# Patient Record
Sex: FEMALE | Race: WHITE | Employment: UNEMPLOYED | URBAN - METROPOLITAN AREA
[De-identification: names, ages, dates, MRNs, and addresses within clinical notes are randomized per-mention and may not be internally consistent; named-entity substitution may affect disease eponyms.]

---

## 2021-11-17 VITALS
WEIGHT: 119.05 LBS | SYSTOLIC BLOOD PRESSURE: 117 MMHG | RESPIRATION RATE: 16 BRPM | OXYGEN SATURATION: 100 % | HEART RATE: 103 BPM | TEMPERATURE: 97.8 F | DIASTOLIC BLOOD PRESSURE: 73 MMHG

## 2021-11-17 PROCEDURE — 99285 EMERGENCY DEPT VISIT HI MDM: CPT

## 2021-11-18 ENCOUNTER — HOSPITAL ENCOUNTER (EMERGENCY)
Facility: CLINIC | Age: 21
Discharge: HOME | End: 2021-11-18
Attending: EMERGENCY MEDICINE | Admitting: EMERGENCY MEDICINE

## 2021-11-18 ENCOUNTER — APPOINTMENT (OUTPATIENT)
Dept: CT IMAGING | Facility: CLINIC | Age: 21
End: 2021-11-18
Attending: EMERGENCY MEDICINE

## 2021-11-18 ENCOUNTER — APPOINTMENT (OUTPATIENT)
Dept: GENERAL RADIOLOGY | Facility: CLINIC | Age: 21
End: 2021-11-18
Attending: EMERGENCY MEDICINE

## 2021-11-18 DIAGNOSIS — S16.1XXA STRAIN OF NECK MUSCLE, INITIAL ENCOUNTER: ICD-10-CM

## 2021-11-18 DIAGNOSIS — S30.1XXA CONTUSION OF ABDOMINAL WALL, INITIAL ENCOUNTER: ICD-10-CM

## 2021-11-18 DIAGNOSIS — S13.4XXA WHIPLASH INJURY TO NECK, INITIAL ENCOUNTER: ICD-10-CM

## 2021-11-18 LAB
ANION GAP SERPL CALCULATED.3IONS-SCNC: 5 MMOL/L (ref 3–14)
BASOPHILS # BLD AUTO: 0.1 10E3/UL (ref 0–0.2)
BASOPHILS NFR BLD AUTO: 1 %
BUN SERPL-MCNC: 18 MG/DL (ref 7–30)
CALCIUM SERPL-MCNC: 8.8 MG/DL (ref 8.5–10.1)
CHLORIDE BLD-SCNC: 109 MMOL/L (ref 94–109)
CO2 SERPL-SCNC: 25 MMOL/L (ref 20–32)
CREAT BLD-MCNC: 0.8 MG/DL (ref 0.5–1)
CREAT SERPL-MCNC: 0.76 MG/DL (ref 0.52–1.04)
EOSINOPHIL # BLD AUTO: 0.1 10E3/UL (ref 0–0.7)
EOSINOPHIL NFR BLD AUTO: 0 %
ERYTHROCYTE [DISTWIDTH] IN BLOOD BY AUTOMATED COUNT: 13.4 % (ref 10–15)
GFR SERPL CREATININE-BSD FRML MDRD: >60 ML/MIN/1.73M2
GFR SERPL CREATININE-BSD FRML MDRD: >90 ML/MIN/1.73M2
GLUCOSE BLD-MCNC: 82 MG/DL (ref 70–99)
HCT VFR BLD AUTO: 38.9 % (ref 35–47)
HGB BLD-MCNC: 12.7 G/DL (ref 11.7–15.7)
IMM GRANULOCYTES # BLD: 0.2 10E3/UL
IMM GRANULOCYTES NFR BLD: 1 %
LYMPHOCYTES # BLD AUTO: 2.4 10E3/UL (ref 0.8–5.3)
LYMPHOCYTES NFR BLD AUTO: 13 %
MCH RBC QN AUTO: 30 PG (ref 26.5–33)
MCHC RBC AUTO-ENTMCNC: 32.6 G/DL (ref 31.5–36.5)
MCV RBC AUTO: 92 FL (ref 78–100)
MONOCYTES # BLD AUTO: 1.8 10E3/UL (ref 0–1.3)
MONOCYTES NFR BLD AUTO: 10 %
NEUTROPHILS # BLD AUTO: 14.2 10E3/UL (ref 1.6–8.3)
NEUTROPHILS NFR BLD AUTO: 75 %
NRBC # BLD AUTO: 0 10E3/UL
NRBC BLD AUTO-RTO: 0 /100
PLATELET # BLD AUTO: 226 10E3/UL (ref 150–450)
POTASSIUM BLD-SCNC: 3.7 MMOL/L (ref 3.4–5.3)
RBC # BLD AUTO: 4.24 10E6/UL (ref 3.8–5.2)
SODIUM SERPL-SCNC: 139 MMOL/L (ref 133–144)
WBC # BLD AUTO: 18.8 10E3/UL (ref 4–11)

## 2021-11-18 PROCEDURE — 85025 COMPLETE CBC W/AUTO DIFF WBC: CPT | Performed by: EMERGENCY MEDICINE

## 2021-11-18 PROCEDURE — 82565 ASSAY OF CREATININE: CPT | Mod: 91

## 2021-11-18 PROCEDURE — 250N000009 HC RX 250: Performed by: EMERGENCY MEDICINE

## 2021-11-18 PROCEDURE — 36415 COLL VENOUS BLD VENIPUNCTURE: CPT | Performed by: EMERGENCY MEDICINE

## 2021-11-18 PROCEDURE — 250N000011 HC RX IP 250 OP 636: Performed by: EMERGENCY MEDICINE

## 2021-11-18 PROCEDURE — 71046 X-RAY EXAM CHEST 2 VIEWS: CPT

## 2021-11-18 PROCEDURE — 80048 BASIC METABOLIC PNL TOTAL CA: CPT | Performed by: EMERGENCY MEDICINE

## 2021-11-18 PROCEDURE — 74177 CT ABD & PELVIS W/CONTRAST: CPT

## 2021-11-18 RX ORDER — METHOCARBAMOL 750 MG/1
750 TABLET, FILM COATED ORAL 3 TIMES DAILY PRN
Qty: 16 TABLET | Refills: 0 | Status: SHIPPED | OUTPATIENT
Start: 2021-11-18 | End: 2021-11-23

## 2021-11-18 RX ORDER — IOPAMIDOL 755 MG/ML
60 INJECTION, SOLUTION INTRAVASCULAR ONCE
Status: COMPLETED | OUTPATIENT
Start: 2021-11-18 | End: 2021-11-18

## 2021-11-18 RX ADMIN — IOPAMIDOL 60 ML: 755 INJECTION, SOLUTION INTRAVENOUS at 01:19

## 2021-11-18 RX ADMIN — SODIUM CHLORIDE 60 ML: 900 INJECTION INTRAVENOUS at 01:19

## 2021-11-18 ASSESSMENT — ENCOUNTER SYMPTOMS
ABDOMINAL PAIN: 1
NECK PAIN: 1
MYALGIAS: 1
SHORTNESS OF BREATH: 0
FLANK PAIN: 1
ARTHRALGIAS: 1
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
## 2021-11-18 NOTE — DISCHARGE INSTRUCTIONS
1. -Take acetaminophen 500 to 1000 mg by mouth every 4 to 6 hours as needed for pain or fever.  Do not take more than 4000 mg in 24 hours.  Do not take within 6 hours of another acetaminophen containing medication such as norco (vicodin) or percocet.  - Take ibuprofen 600 to 800 mg by mouth every 6 to 8 hours as needed for pain or fever  2. Use ice as needed for swelling or pain.  3. Elevate painful extremities to help with swelling.  4. Follow-up with your primary doctor as needed.  5. You may return to the ED as needed for new or worsening symptoms such as reinjury, severe and uncontrollable pain, focal weakness, severe numbness and tingling, any other concerning symptoms.

## 2021-12-01 ENCOUNTER — OFFICE VISIT (OUTPATIENT)
Dept: FAMILY MEDICINE | Facility: CLINIC | Age: 21
End: 2021-12-01

## 2021-12-01 VITALS
HEIGHT: 60 IN | OXYGEN SATURATION: 100 % | SYSTOLIC BLOOD PRESSURE: 98 MMHG | HEART RATE: 70 BPM | WEIGHT: 107 LBS | RESPIRATION RATE: 16 BRPM | DIASTOLIC BLOOD PRESSURE: 56 MMHG | TEMPERATURE: 98 F | BODY MASS INDEX: 21.01 KG/M2

## 2021-12-01 DIAGNOSIS — S16.1XXA STRAIN OF NECK MUSCLE, INITIAL ENCOUNTER: ICD-10-CM

## 2021-12-01 DIAGNOSIS — T07.XXXA MULTIPLE CONTUSIONS: ICD-10-CM

## 2021-12-01 DIAGNOSIS — D72.829 LEUKOCYTOSIS, UNSPECIFIED TYPE: ICD-10-CM

## 2021-12-01 LAB
BASOPHILS # BLD AUTO: 0 10E3/UL (ref 0–0.2)
BASOPHILS NFR BLD AUTO: 0 %
EOSINOPHIL # BLD AUTO: 0.1 10E3/UL (ref 0–0.7)
EOSINOPHIL NFR BLD AUTO: 2 %
ERYTHROCYTE [DISTWIDTH] IN BLOOD BY AUTOMATED COUNT: 13 % (ref 10–15)
HCT VFR BLD AUTO: 36.7 % (ref 35–47)
HGB BLD-MCNC: 11.8 G/DL (ref 11.7–15.7)
LYMPHOCYTES # BLD AUTO: 2 10E3/UL (ref 0.8–5.3)
LYMPHOCYTES NFR BLD AUTO: 31 %
MCH RBC QN AUTO: 29.9 PG (ref 26.5–33)
MCHC RBC AUTO-ENTMCNC: 32.2 G/DL (ref 31.5–36.5)
MCV RBC AUTO: 93 FL (ref 78–100)
MONOCYTES # BLD AUTO: 0.6 10E3/UL (ref 0–1.3)
MONOCYTES NFR BLD AUTO: 10 %
NEUTROPHILS # BLD AUTO: 3.6 10E3/UL (ref 1.6–8.3)
NEUTROPHILS NFR BLD AUTO: 57 %
PLATELET # BLD AUTO: 230 10E3/UL (ref 150–450)
RBC # BLD AUTO: 3.95 10E6/UL (ref 3.8–5.2)
WBC # BLD AUTO: 6.3 10E3/UL (ref 4–11)

## 2021-12-01 PROCEDURE — 85025 COMPLETE CBC W/AUTO DIFF WBC: CPT | Performed by: FAMILY MEDICINE

## 2021-12-01 PROCEDURE — 99203 OFFICE O/P NEW LOW 30 MIN: CPT | Performed by: FAMILY MEDICINE

## 2021-12-01 PROCEDURE — 36415 COLL VENOUS BLD VENIPUNCTURE: CPT | Performed by: FAMILY MEDICINE

## 2021-12-01 RX ORDER — CYCLOBENZAPRINE HCL 5 MG
5 TABLET ORAL 3 TIMES DAILY PRN
Qty: 30 TABLET | Refills: 1 | Status: SHIPPED | OUTPATIENT
Start: 2021-12-01

## 2021-12-01 ASSESSMENT — MIFFLIN-ST. JEOR: SCORE: 1166.85

## 2021-12-01 NOTE — PATIENT INSTRUCTIONS
Caution at the cyclobenzaprine can cause drowsiness. You can take ibuprofen at the same time for pain relief if needed.     Patient Education     Contusiones (moretones)    Otto contusión es un moretón. Un moretón se forma cuando génesis recibe un golpe que no rompe la piel jackie sí los vasos sanguíneos que están debajo. La mehran que se derrama de los vasos rotos provoca enrojecimiento e hinchazón. A medida que abhishek, el moretón probablemente se pondrá de varios colores, keyshawn will, sayra y amarillo. [place] es normal. El moretón debería desaparecer en 2 o 3 semanas.  Factores que aumentan arnold probabilidades de tener moretones  A kelvin todo el annemarie le sale un moretón en algún momento, aunque ciertas personas son más propensas a tenerlos que otras. Ya que los vasos sanguíneos se vuelven más frágiles con el paso de la edad, las probabilidades de que salgan moretones aumentan con el envejecimiento. También es más probable que aparezcan moretones en personas que tienen un trastorno de la coagulación, keyshawn la hemofilia, o que jason medicamentos para reducir la coagulación, keyshawn la aspirina.  Cuándo debe acudir a la chiquis de emergencias (ER, por arnold siglas en inglés)  Los moretones kelvin siempre sanan por sequeira cuenta sin necesidad de tratamientos especiales. Jackie para ciertas personas, un moretón grave puede representar un problema gaviota. Busque atención médica si usted:    Tiene un trastorno de la coagulación keyshawn la hemofilia.    Tiene cirrosis u otra enfermedad grave del hígado.    Orly medicamentos anticoagulantes keysahwn la warfarina (Coumadin).   Qué sucederá en la ER?  Un médico le examinará el moretón y le hará preguntas sobre cualquier problema de felipe que usted tenga. En algunos casos se podría hacer otto prueba para determinar si la mehran se le coagula normalmente. Puede que necesite otros tratamientos según arnold necesidades.  Visita de control  A veces un moretón empeora en vez de mejorar, y puede aumentar de tamaño e  hincharse más. [place] puede suceder cuando el cuerpo retiene otto pequeña acumulación de mehran debajo de la piel (hematoma). En ciertos casos muy raros, podría ser necesario que sequeira médico le drene el exceso de mehran de la geoffrey.  Consejo:  Para ayudar a reducir el enrojecimiento y la hinchazón, aplique otto bolsa de hielo o de verduras congeladas sobre un moretón (use un paño carvalho entre el objeto frío y la piel).     [id number] [employer]. Todos los derechos reservados. Esta información no pretende sustituir la atención médica profesional. Sólo sequeira médico puede diagnosticar y tratar un problema de felipe.           Patient Education     Cyclobenzaprine HCl Oral Tablet 5 mg  Usos  Esteban medicamento se usa para las siguientes afecciones:    inflamación    espasmos musculares  Instrucciones  Esteban medicamento se puede neelam con o sin alimentos.  Mantenga el medicamento a temperatura ambiente, alejado supriya y el calor.  Si olvida neelam otto dosis, tómela tan pronto keyshawn se acuerde. Si es kelvin hora de sequeira siguiente dosis, en lugar de neelam la dosis olvidada vuelva a sequeira programa regular. No tome 2 dosis de esteban medicamento a la vez.  Infórmele a sequeira médico y a sequeira farmacéutico acerca de todos los medicamentos que usa. Incluya los medicamentos tanto de venta con receta keyshawn sin receta. Infórmele también acerca de toda vitamina, medicamento a base de hierbas, o cualquier otra cosa que use para sequeira felipe.  Precauciones  Informe a sequeira médico y a sequeira farmacéutico si alguna vez ha tenido otto reacción alérgica a un medicamento. Entre los síntomas de otto reacción alérgica se incluyen: dificultad para respirar, erupción en la piel, comezón, hinchazón o mareos intensos.  No use el medicamento más veces de lo indicado.  Esteban medicamento puede afectar sequeira capacidad de mantenerse alerta o de reaccionar con rapidez. No maneje ni opere máquinas hasta que sepa qué efecto le provocará esteban medicamento.  No ellen nada que contenga  alcohol mientras use esteban medicamento.  Evite el exceso de calor cuando ana luisa ejercicio u otras actividades. Trate de mantenerse fresco si hace calor.  Informe a sequeira médico o farmacéutico si está o planea quedar embarazada, o si está amamantando.  Pregúntele a sequeira farmacéutico si esteban medicamento puede interactuar con cualquiera de arnold otros medicamentos. No deje de informarle acerca de todos los medicamentos que usted usa.  No empiece ni deje de neelam otros medicamentos sin hablar jacques con el médico o farmacéutico.  No comparta esteban medicamento con otras personas a quienes no se les recetó.  Efectos Secundarios  La siguiente es otto lista de algunos efectos secundarios comunes de esteban medicamento. Hable con el médico para saber qué debe hacer en regine de tener estos u otros efectos secundarios.    estreñimiento    mareos    somnolencia o sedación    sequedad de la boca    falta de energía y cansancio  Algunas personas podrían tener reacciones alérgicas a esteban medicamento. Entre los síntomas pueden incluirse: dificultad para respirar, erupción en la piel, comezón, hinchazón o mareos intensos. Si nota algunos de estos síntomas, busque asistencia médica rápidamente.  Extra  Hable con sequeira médico, enfermero o farmacéutico si tiene alguna pregunta acerca de esteban medicamento.  [email or web address]  NOTA IMPORTANTE: En esteban documento hay otto explicación breve sobre cómo usar el medicamento, kenneth no incluye todo lo que hay que saber acerca del medicamento. Sequeira médico o sequeira farmacéutico podría suministrarle otros documentos acerca de sequeira medicamento. Comuníquese con ellos si tiene alguna pregunta. Siga siempre arnold consejos. Otto descripción más completa de esteban medicamento está disponible en inglés. Escanee esteban código en sequeira teléfono inteligente o en sequeira tableta, o use la Admatición web que aparece a continuación. También puede pedirle a sequeira farmacéutico otto copia impresa. Si tiene alguna pregunta, hágasela a  sequeira farmacéutico.     2021 [facility], [facility].
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
